# Patient Record
Sex: FEMALE | Race: WHITE | Employment: UNEMPLOYED | URBAN - METROPOLITAN AREA
[De-identification: names, ages, dates, MRNs, and addresses within clinical notes are randomized per-mention and may not be internally consistent; named-entity substitution may affect disease eponyms.]

---

## 2020-08-18 ENCOUNTER — HOSPITAL ENCOUNTER (OUTPATIENT)
Dept: WOMENS IMAGING | Age: 45
Discharge: HOME | End: 2020-08-18
Payer: COMMERCIAL

## 2020-08-18 PROCEDURE — 77063 BREAST TOMOSYNTHESIS BI: CPT

## 2021-06-09 ENCOUNTER — APPOINTMENT (OUTPATIENT)
Dept: GENERAL RADIOLOGY | Age: 46
End: 2021-06-09
Payer: COMMERCIAL

## 2021-06-09 ENCOUNTER — HOSPITAL ENCOUNTER (EMERGENCY)
Age: 46
Discharge: HOME | End: 2021-06-09
Attending: EMERGENCY MEDICINE
Payer: COMMERCIAL

## 2021-06-09 ENCOUNTER — APPOINTMENT (OUTPATIENT)
Dept: MRI IMAGING | Age: 46
End: 2021-06-09
Payer: COMMERCIAL

## 2021-06-09 ENCOUNTER — APPOINTMENT (OUTPATIENT)
Dept: CT IMAGING | Age: 46
End: 2021-06-09
Payer: COMMERCIAL

## 2021-06-09 VITALS
BODY MASS INDEX: 25.72 KG/M2 | HEIGHT: 61 IN | RESPIRATION RATE: 16 BRPM | DIASTOLIC BLOOD PRESSURE: 80 MMHG | HEART RATE: 86 BPM | WEIGHT: 136.24 LBS | TEMPERATURE: 98.2 F | OXYGEN SATURATION: 99 % | SYSTOLIC BLOOD PRESSURE: 113 MMHG

## 2021-06-09 DIAGNOSIS — M51.27 PROTRUSION OF INTERVERTEBRAL DISC OF LUMBOSACRAL REGION: ICD-10-CM

## 2021-06-09 DIAGNOSIS — M54.12 CERVICAL RADICULOPATHY: ICD-10-CM

## 2021-06-09 DIAGNOSIS — R93.89 ABNORMAL FINDING ON CT SCAN: ICD-10-CM

## 2021-06-09 DIAGNOSIS — M54.50 ACUTE BILATERAL LOW BACK PAIN WITHOUT SCIATICA: ICD-10-CM

## 2021-06-09 DIAGNOSIS — M54.6 ACUTE BILATERAL THORACIC BACK PAIN: ICD-10-CM

## 2021-06-09 DIAGNOSIS — M50.30 DDD (DEGENERATIVE DISC DISEASE), CERVICAL: ICD-10-CM

## 2021-06-09 DIAGNOSIS — M54.2 NECK PAIN: Primary | ICD-10-CM

## 2021-06-09 LAB
ANION GAP SERPL CALCULATED.3IONS-SCNC: 10 MMOL/L (ref 3–16)
BACTERIA: ABNORMAL /HPF
BASOPHILS ABSOLUTE: 0 K/UL (ref 0–0.2)
BASOPHILS RELATIVE PERCENT: 0.3 %
BILIRUBIN URINE: NEGATIVE
BLOOD, URINE: NEGATIVE
BUN BLDV-MCNC: 10 MG/DL (ref 7–20)
C-REACTIVE PROTEIN: 22.8 MG/L (ref 0–5.1)
CALCIUM SERPL-MCNC: 8.7 MG/DL (ref 8.3–10.6)
CHLORIDE BLD-SCNC: 104 MMOL/L (ref 99–110)
CLARITY: ABNORMAL
CO2: 25 MMOL/L (ref 21–32)
COLOR: YELLOW
CREAT SERPL-MCNC: 0.6 MG/DL (ref 0.6–1.1)
EOSINOPHILS ABSOLUTE: 0.1 K/UL (ref 0–0.6)
EOSINOPHILS RELATIVE PERCENT: 1 %
EPITHELIAL CELLS, UA: 2 /HPF (ref 0–5)
GFR AFRICAN AMERICAN: >60
GFR NON-AFRICAN AMERICAN: >60
GLUCOSE BLD-MCNC: 101 MG/DL (ref 70–99)
GLUCOSE URINE: NEGATIVE MG/DL
HCT VFR BLD CALC: 40.7 % (ref 36–48)
HEMOGLOBIN: 14 G/DL (ref 12–16)
HYALINE CASTS: 0 /LPF (ref 0–8)
KETONES, URINE: NEGATIVE MG/DL
LACTIC ACID: 0.7 MMOL/L (ref 0.4–2)
LEUKOCYTE ESTERASE, URINE: NEGATIVE
LYMPHOCYTES ABSOLUTE: 2.4 K/UL (ref 1–5.1)
LYMPHOCYTES RELATIVE PERCENT: 21.4 %
MCH RBC QN AUTO: 32.1 PG (ref 26–34)
MCHC RBC AUTO-ENTMCNC: 34.5 G/DL (ref 31–36)
MCV RBC AUTO: 93.1 FL (ref 80–100)
MICROSCOPIC EXAMINATION: YES
MONOCYTES ABSOLUTE: 1.1 K/UL (ref 0–1.3)
MONOCYTES RELATIVE PERCENT: 9.5 %
NEUTROPHILS ABSOLUTE: 7.6 K/UL (ref 1.7–7.7)
NEUTROPHILS RELATIVE PERCENT: 67.8 %
NITRITE, URINE: NEGATIVE
PDW BLD-RTO: 13.9 % (ref 12.4–15.4)
PH UA: 6 (ref 5–8)
PLATELET # BLD: 375 K/UL (ref 135–450)
PMV BLD AUTO: 7.4 FL (ref 5–10.5)
POTASSIUM SERPL-SCNC: 4.1 MMOL/L (ref 3.5–5.1)
PROTEIN UA: NEGATIVE MG/DL
RBC # BLD: 4.37 M/UL (ref 4–5.2)
RBC UA: 1 /HPF (ref 0–4)
S PYO AG THROAT QL: NEGATIVE
SARS-COV-2, NAAT: NOT DETECTED
SEDIMENTATION RATE, ERYTHROCYTE: 52 MM/HR (ref 0–20)
SODIUM BLD-SCNC: 139 MMOL/L (ref 136–145)
SPECIFIC GRAVITY UA: 1 (ref 1–1.03)
TROPONIN: <0.01 NG/ML
URINE REFLEX TO CULTURE: ABNORMAL
URINE TYPE: ABNORMAL
UROBILINOGEN, URINE: 0.2 E.U./DL
WBC # BLD: 11.3 K/UL (ref 4–11)
WBC UA: 4 /HPF (ref 0–5)

## 2021-06-09 PROCEDURE — 6360000004 HC RX CONTRAST MEDICATION: Performed by: NURSE PRACTITIONER

## 2021-06-09 PROCEDURE — 87081 CULTURE SCREEN ONLY: CPT

## 2021-06-09 PROCEDURE — 70450 CT HEAD/BRAIN W/O DYE: CPT

## 2021-06-09 PROCEDURE — 80048 BASIC METABOLIC PNL TOTAL CA: CPT

## 2021-06-09 PROCEDURE — 93005 ELECTROCARDIOGRAM TRACING: CPT | Performed by: EMERGENCY MEDICINE

## 2021-06-09 PROCEDURE — 81001 URINALYSIS AUTO W/SCOPE: CPT

## 2021-06-09 PROCEDURE — 6370000000 HC RX 637 (ALT 250 FOR IP): Performed by: NURSE PRACTITIONER

## 2021-06-09 PROCEDURE — 72156 MRI NECK SPINE W/O & W/DYE: CPT

## 2021-06-09 PROCEDURE — 85025 COMPLETE CBC W/AUTO DIFF WBC: CPT

## 2021-06-09 PROCEDURE — 84484 ASSAY OF TROPONIN QUANT: CPT

## 2021-06-09 PROCEDURE — 96374 THER/PROPH/DIAG INJ IV PUSH: CPT

## 2021-06-09 PROCEDURE — 87040 BLOOD CULTURE FOR BACTERIA: CPT

## 2021-06-09 PROCEDURE — 72125 CT NECK SPINE W/O DYE: CPT

## 2021-06-09 PROCEDURE — 71045 X-RAY EXAM CHEST 1 VIEW: CPT

## 2021-06-09 PROCEDURE — 87880 STREP A ASSAY W/OPTIC: CPT

## 2021-06-09 PROCEDURE — 99283 EMERGENCY DEPT VISIT LOW MDM: CPT

## 2021-06-09 PROCEDURE — 87635 SARS-COV-2 COVID-19 AMP PRB: CPT

## 2021-06-09 PROCEDURE — 83605 ASSAY OF LACTIC ACID: CPT

## 2021-06-09 PROCEDURE — 72158 MRI LUMBAR SPINE W/O & W/DYE: CPT

## 2021-06-09 PROCEDURE — A9577 INJ MULTIHANCE: HCPCS | Performed by: NURSE PRACTITIONER

## 2021-06-09 PROCEDURE — 87077 CULTURE AEROBIC IDENTIFY: CPT

## 2021-06-09 PROCEDURE — 72157 MRI CHEST SPINE W/O & W/DYE: CPT

## 2021-06-09 PROCEDURE — 96375 TX/PRO/DX INJ NEW DRUG ADDON: CPT

## 2021-06-09 PROCEDURE — 6360000002 HC RX W HCPCS: Performed by: NURSE PRACTITIONER

## 2021-06-09 PROCEDURE — 36415 COLL VENOUS BLD VENIPUNCTURE: CPT

## 2021-06-09 PROCEDURE — 86140 C-REACTIVE PROTEIN: CPT

## 2021-06-09 PROCEDURE — 6360000002 HC RX W HCPCS

## 2021-06-09 PROCEDURE — 85652 RBC SED RATE AUTOMATED: CPT

## 2021-06-09 RX ORDER — ACETAMINOPHEN 325 MG/1
650 TABLET ORAL ONCE
Status: COMPLETED | OUTPATIENT
Start: 2021-06-09 | End: 2021-06-09

## 2021-06-09 RX ORDER — DIPHENHYDRAMINE HYDROCHLORIDE 50 MG/ML
25 INJECTION INTRAMUSCULAR; INTRAVENOUS ONCE
Status: COMPLETED | OUTPATIENT
Start: 2021-06-09 | End: 2021-06-09

## 2021-06-09 RX ORDER — METHYLPREDNISOLONE 4 MG/1
TABLET ORAL
Qty: 1 KIT | Refills: 0 | Status: SHIPPED | OUTPATIENT
Start: 2021-06-09

## 2021-06-09 RX ORDER — 0.9 % SODIUM CHLORIDE 0.9 %
30 INTRAVENOUS SOLUTION INTRAVENOUS ONCE
Status: DISCONTINUED | OUTPATIENT
Start: 2021-06-09 | End: 2021-06-10 | Stop reason: HOSPADM

## 2021-06-09 RX ORDER — LORAZEPAM 2 MG/ML
INJECTION INTRAMUSCULAR
Status: COMPLETED
Start: 2021-06-09 | End: 2021-06-09

## 2021-06-09 RX ADMIN — GADOBENATE DIMEGLUMINE 12 ML: 529 INJECTION, SOLUTION INTRAVENOUS at 19:55

## 2021-06-09 RX ADMIN — LORAZEPAM: 2 INJECTION INTRAMUSCULAR; INTRAVENOUS at 19:10

## 2021-06-09 RX ADMIN — ACETAMINOPHEN 650 MG: 325 TABLET ORAL at 15:44

## 2021-06-09 RX ADMIN — DIPHENHYDRAMINE HYDROCHLORIDE 25 MG: 50 INJECTION, SOLUTION INTRAMUSCULAR; INTRAVENOUS at 18:07

## 2021-06-09 RX ADMIN — IBUPROFEN 600 MG: 200 TABLET, FILM COATED ORAL at 15:44

## 2021-06-09 ASSESSMENT — PAIN DESCRIPTION - ONSET: ONSET: ON-GOING

## 2021-06-09 ASSESSMENT — PAIN SCALES - GENERAL
PAINLEVEL_OUTOF10: 10
PAINLEVEL_OUTOF10: 10

## 2021-06-09 ASSESSMENT — PAIN DESCRIPTION - PAIN TYPE: TYPE: ACUTE PAIN

## 2021-06-09 ASSESSMENT — PAIN DESCRIPTION - LOCATION: LOCATION: HEAD;NECK;THROAT

## 2021-06-09 ASSESSMENT — PAIN DESCRIPTION - FREQUENCY: FREQUENCY: CONTINUOUS

## 2021-06-09 NOTE — ED PROVIDER NOTES
used in several months. She is not diabetic. Physical Exam:     Constitutional: No apparent distress. Moderate discomfort. Head: No visible evidence of trauma. Normocephalic. Eyes: Pupils equal and reactive. No photophobia. Conjunctiva normal.    HENT: Oral mucosa moist.  Airway patent. Pharynx reveals mild erythema to the anterior palatine arch and posterior pharynx. Uvula midline. No exudates. No stridor or drooling. Able to swallow saliva without difficulty. Anterior neck was nontender. No lymphadenopathy. Neck:  Soft and supple. Nontender. No point or axial tenderness. No meningeal signs. However, there was significant tenderness along the left trapezius muscle group in the left suprascapular area. The patient was exquisitely tender and complained of severe pain with palpation. No rash. Heart:  Regular rate and rhythm. No murmur. Lungs:  Clear to auscultation. No wheezes, rales, or ronchi. No conversational dyspnea or accessory muscle use. Abdomen:  Soft, non-distended. Nontender. No guarding rigidity or rebound. No CVA tenderness. Musculoskeletal: Thoracic and lumbar spine was nontender to palpation. No point tenderness or step-off.   paravertebral muscle tenderness noted in the thoracolumbar spine. Mild pain with range of motion. Extremities non-tender with full range of motion. Neurological: Alert and oriented x 3. GCS 15.  Speech clear. No acute focal motor or sensory deficits. No dysarthria. No aphasia. No pronator drift. Negative finger-to-nose. Negative heel-to-shin. Great toe extensor strength was 5/5 bilaterally. The patient was able to flex and extend the hip and knee bilaterally against resistance with strength 5/5. Deep tendon reflexes were 2/4 in the patellar and Achilles tendons bilaterally. No saddle anesthesia. No rash. Negative straight leg raising. No radicular pain. Gait was steady. Skin: Skin is warm and dry. No rash.    Psychiatric: Normal mood and affect.  Behavior is normal.     DIAGNOSTIC RESULTS     EKG: All EKG's are interpreted by the Emergency Department Physician who either signs or Co-signs this chart in the absence of a cardiologist.        RADIOLOGY:   Non-plain film images such as CT, Ultrasound and MRI are read by the radiologist. Plain radiographic images are visualized and preliminarily interpreted by the emergency physician with the below findings:        Interpretation per the Radiologist below, if available at the time of this note:    XR CHEST PORTABLE   Final Result   No active cardiopulmonary disease         CT HEAD WO CONTRAST    (Results Pending)   CT CERVICAL SPINE WO CONTRAST    (Results Pending)         ED BEDSIDE ULTRASOUND:   Performed by ED Physician - none    LABS:  Labs Reviewed   CBC WITH AUTO DIFFERENTIAL - Abnormal; Notable for the following components:       Result Value    WBC 11.3 (*)     All other components within normal limits    Narrative:     Performed at:  [address] [place] [address]   Phone [phone]   BASIC METABOLIC PANEL - Abnormal; Notable for the following components:    Glucose 101 (*)     All other components within normal limits    Narrative:     Performed at:  [address] [place] [address]   Phone [phone]   URINE RT REFLEX TO CULTURE - Abnormal; Notable for the following components:    Clarity, UA CLOUDY (*)     All other components within normal limits    Narrative:     Performed at:  [address] [place] [address]   Phone [phone]   MICROSCOPIC URINALYSIS - Abnormal; Notable for the following components:    Bacteria, UA 1+ (*)     All other components within normal limits    Narrative:     Performed at:  [facility]  [address] [place] [address] [place] [address]   Phone [phone]   STREP SCREEN GROUP A THROAT    Narrative:     Performed at:  [facility]  [address] [facility] [place] [address]   Phone [phone]   COVID-19, RAPID   CULTURE, BETA STREP CONFIRM PLATES   CULTURE, BLOOD 1   CULTURE, BLOOD 2   SEDIMENTATION RATE   C-REACTIVE PROTEIN   LACTIC ACID, PLASMA   TROPONIN       All other labs were within normal range or not returned as of this dictation. EMERGENCY DEPARTMENT COURSE and DIFFERENTIAL DIAGNOSIS/MDM:   Vitals:    Vitals:    06/09/21 1307   BP: 93/68   Pulse: 86   Resp: 16   Temp: 98.2 °F (36.8 °C)   TempSrc: Oral   SpO2: 100%   Weight: 136 lb 3.9 oz (61.8 kg)   Height: 5' 1\" (1.549 m)       The patient presents with fatigue, sore throat, aching all over, possible fever, neck pain, mild headache, bilateral earache since yesterday morning. She is afebrile. Blood pressure was 93/68. She was given normal saline 30 mL/kg bolus. White blood cell count is slightly elevated at 11.3. Neutrophil count is normal.  Rapid strep screen is negative. Urinalysis is significant for 4 white cells but negative nitrite. Chest x-ray is unremarkable. Etiology the patient's symptoms is unclear. Covid test is pending. Differential diagnosis would include viral syndrome, COVID-19. Given her history of IV drug use and severe pain in the left side of the neck radiating to the left shoulder with radicular pain described as sharp and burning, I am concerned about the possibility of a cervical radiculopathy. This does raise the possibility of a cervical discitis or even epidural abscess. For this reason, CRP and sed rate have been ordered and are pending. The time of change of shift, test results are pending including CT head and cervical spine, troponin, EKG. If CRP and sed rate are elevated the patient may need further evaluation with MRI.   Care will be transferred to the oncoming physician for follow-up on test results with the nurse practitioner who is caring for the patient. [facility]      CRITICAL CARE TIME   Total Critical Care time was 0 minutes, excluding separately reportable procedures. There was a high probability of clinically significant/life threatening deterioration in the patient's condition which required my urgent intervention. CONSULTS:  None    PROCEDURES:  Unless otherwise noted below, none     Procedures        FINAL IMPRESSION      1. Neck pain    2. Cervical radiculopathy          DISPOSITION/PLAN   DISPOSITION        PATIENT REFERRED TO:  No follow-up provider specified. DISCHARGE MEDICATIONS:  New Prescriptions    No medications on file     Controlled Substances Monitoring:     RX Monitoring 10/26/2016   Attestation The Prescription Monitoring Report for this patient was reviewed today. (Please note that portions of this note were completed with a voice recognition program.  Efforts were made to edit the dictations but occasionally words are mis-transcribed. )    [id number] Juan Manuel Romero DO (electronically signed)  Attending Emergency Physician      Jimmie Muir DO  06/09/21 2588

## 2021-06-09 NOTE — ED PROVIDER NOTES
[facility] Emergency Department    CHIEF COMPLAINT  No chief complaint on file. SHARED SERVICE VISIT:  I have seen and evaluated this patient with my supervising physician, Dr. Cierra Peralta. HISTORY OF PRESENT ILLNESS  Kindra Mir is a 48 y.o.non-toxic appearing female with a history of IVDU who presents to the ED with multiple complaints x2 days, including \"burning and tight\" 8/10 left paraspinous/scapular neck/back pain, \"aching\" 3/10 bilateral otalgia, 5/10 \"aching\" sore throat, subjective fever, chills, sweats, change in taste and decreased urine output. Denies nausea, vomiting, cough, hemoptysis, leg/calf pain or swelling, abdominal pain, urinary symptoms, saddle anesthesia, loss of bowel or bladder control, extremity weakness, urinary retention, or other complaints. She has not received the COVID-19 vaccination. No other complaints, modifying factors or associated symptoms. Nursing notes reviewed. Past Medical History:   Diagnosis Date    ADHD (attention deficit hyperactivity disorder)     Anxiety     Chronic back pain      Past Surgical History:   Procedure Laterality Date    CARPAL TUNNEL RELEASE      HAND SURGERY      LEG SURGERY      SPINE SURGERY       No family history on file.   Social History     Socioeconomic History    Marital status: Single     Spouse name: Not on file    Number of children: Not on file    Years of education: Not on file    Highest education level: Not on file   Occupational History    Not on file   Tobacco Use    Smoking status: Current Every Day Smoker     Packs/day: 0.50     Years: 15.00     Pack years: 7.50   Substance and Sexual Activity    Alcohol use: No    Drug use: No    Sexual activity: Yes   Other Topics Concern    Not on file   Social History Narrative    Not on file     Social Determinants of Health     Financial Resource Strain:     Difficulty of Paying Living Expenses:    Food Insecurity:     Worried About Running Out of Food in the Last Year:    [address] in the Last Year:    Transportation Needs:     Lack of Transportation (Medical):  Lack of Transportation (Non-Medical):    Physical Activity:     Days of Exercise per Week:     Minutes of Exercise per Session:    Stress:     Feeling of Stress :    Social Connections:     Frequency of Communication with Friends and Family:     Frequency of Social Gatherings with Friends and Family:     Attends Buddhism Services:     Active Member of Clubs or Organizations:     Attends Club or Organization Meetings:     Marital Status:    Intimate Partner Violence:     Fear of Current or Ex-Partner:     Emotionally Abused:     Physically Abused:     Sexually Abused:      No current facility-administered medications for this encounter. Current Outpatient Medications   Medication Sig Dispense Refill    ibuprofen (ADVIL;MOTRIN) 600 MG tablet Take 1 tablet by mouth every 6 hours as needed for Pain 30 tablet 0    ALPRAZolam (XANAX) 1 MG tablet Take 1 mg by mouth       Allergies   Allergen Reactions    Adhesive Tape     Codeine Itching    Ketorolac Swelling    Penicillins        REVIEW OF SYSTEMS  6 systems reviewed, pertinent positives per HPI otherwise noted to be negative    PHYSICAL EXAM  BP 93/68   Pulse 86   Temp 98.2 °F (36.8 °C) (Oral)   Resp 16   Ht 5' 1\" (1.549 m)   Wt 136 lb 3.9 oz (61.8 kg)   SpO2 100%   BMI 25.74 kg/m²   GENERAL APPEARANCE: Awake and alert. Cooperative. No acute distress. HEAD: Normocephalic. Atraumatic. EYES: PERRL. EOM's grossly intact. ENT: Mucous membranes are moist.  Uvula is midline. There is erythema noted to posterior pharynx. No exudates. No anterior cervical chain/submandibular adenopathy. NECK: Supple. Normal ROM. There is no midline cervical spine tenderness on palpation. There is no anterior cervical chain adenopathy upon palpation. CHEST: Equal symmetric chest rise. LUNGS: Breathing is unlabored. Speaking comfortably in full sentences. Abdomen: Nondistended. Negative Rovsing, psoas, obturators, Erick's, and Cutler's signs.  + Suprapubic tenderness. Back: + Left CVAT. There is no midline thoracic or lumbar spine tenderness upon palpation. EXTREMITIES: MAEE. No acute deformities. SKIN: Warm and dry. NEUROLOGICAL: Alert and oriented. Strength is 5/5 in all extremities and sensation is intact. RADIOLOGY  No results found. ED COURSE  Patient received ibuprofen and Tylenol for pain, with good relief.      Labs ordered  I have reviewed and interpreted all of the currently available lab results from this visit:  Results for orders placed or performed during the hospital encounter of 06/09/21   Strep Screen Group A Throat    Specimen: Throat   Result Value Ref Range    Rapid Strep A Screen Negative Negative   COVID-19, Rapid    Specimen: Nasopharyngeal Swab   Result Value Ref Range    SARS-CoV-2, NAAT Not Detected Not Detected   CBC Auto Differential   Result Value Ref Range    WBC 11.3 (H) 4.0 - 11.0 K/uL    RBC 4.37 4.00 - 5.20 M/uL    Hemoglobin 14.0 12.0 - 16.0 g/dL    Hematocrit 40.7 36.0 - 48.0 %    MCV 93.1 80.0 - 100.0 fL    MCH 32.1 26.0 - 34.0 pg    MCHC 34.5 31.0 - 36.0 g/dL    RDW 13.9 12.4 - 15.4 %    Platelets 685 322 - 034 K/uL    MPV 7.4 5.0 - 10.5 fL    Neutrophils % 67.8 %    Lymphocytes % 21.4 %    Monocytes % 9.5 %    Eosinophils % 1.0 %    Basophils % 0.3 %    Neutrophils Absolute 7.6 1.7 - 7.7 K/uL    Lymphocytes Absolute 2.4 1.0 - 5.1 K/uL    Monocytes Absolute 1.1 0.0 - 1.3 K/uL    Eosinophils Absolute 0.1 0.0 - 0.6 K/uL    Basophils Absolute 0.0 0.0 - 0.2 K/uL   Basic Metabolic Panel   Result Value Ref Range    Sodium 139 136 - 145 mmol/L    Potassium 4.1 3.5 - 5.1 mmol/L    Chloride 104 99 - 110 mmol/L    CO2 25 21 - 32 mmol/L    Anion Gap 10 3 - 16    Glucose 101 (H) 70 - 99 mg/dL    BUN 10 7 - 20 mg/dL    CREATININE 0.6 0.6 - 1.1 mg/dL    GFR Non-African American >60 >60 GFR African American >60 >60    Calcium 8.7 8.3 - 10.6 mg/dL   Urinalysis Reflex to Culture    Specimen: Urine, clean catch   Result Value Ref Range    Color, UA YELLOW Straw/Yellow    Clarity, UA CLOUDY (A) Clear    Glucose, Ur Negative Negative mg/dL    Bilirubin Urine Negative Negative    Ketones, Urine Negative Negative mg/dL    Specific Gravity, UA 1.005 1.005 - 1.030    Blood, Urine Negative Negative    pH, UA 6.0 5.0 - 8.0    Protein, UA Negative Negative mg/dL    Urobilinogen, Urine 0.2 <2.0 E.U./dL    Nitrite, Urine Negative Negative    Leukocyte Esterase, Urine Negative Negative    Microscopic Examination YES     Urine Type NotGiven     Urine Reflex to Culture Not Indicated    Microscopic Urinalysis   Result Value Ref Range    Bacteria, UA 1+ (A) None Seen /HPF    Hyaline Casts, UA 0 0 - 8 /LPF    WBC, UA 4 0 - 5 /HPF    RBC, UA 1 0 - 4 /HPF    Epithelial Cells, UA 2 0 - 5 /HPF   Sedimentation Rate   Result Value Ref Range    Sed Rate 52 (H) 0 - 20 mm/Hr   C-Reactive Protein   Result Value Ref Range    CRP 22.8 (H) 0.0 - 5.1 mg/L   Lactic Acid, Plasma   Result Value Ref Range    Lactic Acid 0.7 0.4 - 2.0 mmol/L   Troponin   Result Value Ref Range    Troponin <0.01 <0.01 ng/mL           Imaging ordered  CT HEAD WO CONTRAST    Result Date: 6/9/2021  1. No evidence of acute intracranial abnormality. 2. Focal mass centered within the leftward aspect of the quadrigeminal (tectal plate) cistern most consistent with presence of small lipoma with measurements as described above. CT CERVICAL SPINE WO CONTRAST    Result Date: 6/9/2021  Remote fusion of the C6-C7 vertebral bodies with moderate to severe degenerative changes at C5-C6. No acute osseous abnormality. MRI CERVICAL SPINE W WO CONTRAST    Result Date: 6/9/2021  There is fusion at C6-7. Moderate cervical spondylosis at C4-5 and C5-6 resulting in mild spinal canal stenosis. There is moderate left neural foraminal stenosis at C5-6.  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 MRI THORACIC SPINE W WO CONTRAST    Result Date: 6/11/2021  Motion artifact mildly degrades the images. No disc abnormality, stenosis of the thecal sac or narrowing of the neural foramina in the thoracic region. Probable atypical hemangioma of T10. No abnormal enhancement in the spinal canal.     MRI LUMBAR SPINE W WO CONTRAST    Result Date: 6/11/2021  Small disc protrusions at L4-5 and L5-S1. There is mild stenosis of the thecal sac at L4-5. There is narrowing of the neural foramina bilaterally as discussed above. No abnormal enhancement in the lumbar region. XR CHEST PORTABLE    Result Date: 6/9/2021  No active cardiopulmonary disease         A discussion was had with Mrs. Wei Wilson regarding neck pain, cervical radiculopathy, bilateral low back pain, bilateral thoracic, and intention to discharge. Risk management discussed and shared decision making had with patient and/or surrogate. All questions were answered. Patient will follow up with her PCP for further evaluation/treatment. Patient will return to ED for new/worsening symptoms. Patient was sent home with a prescription for Medrol Dosepak. MDM  Patient presents to the emergency department with neck and back pain. Alternative diagnoses are less likely based on history and physical. Considered abdominal aortic aneurysm, cauda equina syndrome, epidural mass lesion (SEA), spinal stenosis, herniated disc causing severe stenosis, sprain/strain, fracture, contusion, sciatica, UTI, pyelonephritis, kidney stone, less but less likely based on history and physical.    As patient has an elevated [email or web address] and ESR 52 mm/h the patient will require MRI of cervical, thoracic, lumbar spine as she does have a history of IV drug use and we are concerned for spinal epidural abscess.     Work-up reveals:  Blood culture #1: Pending     Blood culture #2: Pending    ESR: 52    CRP: 22.8    Troponin: <0.01    Lactic acid: 0.7    COVID-19, rapid: not detected    Culture, beta strep confirm plates: pending    BMP: negative for electrolyte abnormality, renal dysfunctoin, glucose 101, otherwise unremarkable    Urine reflex to culture: clarity cloudy, otherwise unremarkable    Microscopic urinalysis: Clarity cloudy, otherwise unremarkable    CBC: mild leukocytosis with WBC 11.3, otherwise unremarkable    EKG: Sinus bradycardia with a ventricular rate of 53 bpm.    CXR: No active cardiopulmonary disease. CT cervical spine: Remote fusion of the C6-C7 vertebral bodies with moderate to severe degenerative changes at C5-C6. No acute abnormality. CT head: No evidence of acute intracranial abnormality. Focal mass centered within the leftward aspect of the quadrigeminal (tectal plate) cistern most consistent with the presence of small lipoma with measurement as described above. MRI cervical spine: There is fusion of C6-C7. Moderate cervical spondylosis at C4-5 and C5-6 resulting in mild spinal canal stenosis, There is moderate left neural foraminal stenosis at C5-6. 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 MRI lumbar spine: Small disc protrusion at L4-5 and L5-S1. There is mild stenosis of the thecal sac at L4-5. There is narrowing of the neural foramina bilaterally as discussed above. No abnormal enhancement in the lumbar region. MRI thoracic spine: Motion artifact mildly degrades the images. No disc abnormality, stenosis of the thecal sac or narrowing of the neural foramina in the thoracic region. Probable atypical hemangioma of T10. No abnormal enhancement in the spinal canal.    Given the patient's history of IVDU, neck and back pain, and elevated ESR/CRP the patient was evaluated for SEA via MRI of the cervical, thoracic, and lumbar spine. This imaging identified no spinal epidural abscess.       Therefore:  My attending physician and I feel that the patient is safe and appropriate for discharge home with outpatient with her PCP as well as neurosurgery for further evaluation and treatment. She will be prescribed a Medrol Dosepak to be taken as prescribed an return to the emergency department for new or worsening symptoms including but not limited to, developing numbness or tingling between your legs or any backside, loss of bowel or bladder control, inability to ambulate, fever, chills, sweats, nighttime exacerbation of your pain, or other concerns. Patient is agreeable with plan for discharge and follow-up.       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

## 2021-06-09 NOTE — ED NOTES
Received call from MRI that patient was becoming agitated and having difficulty laying still. Dr. Nneka Dukes made aware, gave verbal order for Ativan 2mg IV.      Hector Murphy RN  06/09/21 5641

## 2021-06-09 NOTE — ED PROVIDER NOTES
will be called.    CBC Auto Differential   Result Value Ref Range    WBC 11.3 (H) 4.0 - 11.0 K/uL    RBC 4.37 4.00 - 5.20 M/uL    Hemoglobin 14.0 12.0 - 16.0 g/dL    Hematocrit 40.7 36.0 - 48.0 %    MCV 93.1 80.0 - 100.0 fL    MCH 32.1 26.0 - 34.0 pg    MCHC 34.5 31.0 - 36.0 g/dL    RDW 13.9 12.4 - 15.4 %    Platelets 265 606 - 350 K/uL    MPV 7.4 5.0 - 10.5 fL    Neutrophils % 67.8 %    Lymphocytes % 21.4 %    Monocytes % 9.5 %    Eosinophils % 1.0 %    Basophils % 0.3 %    Neutrophils Absolute 7.6 1.7 - 7.7 K/uL    Lymphocytes Absolute 2.4 1.0 - 5.1 K/uL    Monocytes Absolute 1.1 0.0 - 1.3 K/uL    Eosinophils Absolute 0.1 0.0 - 0.6 K/uL    Basophils Absolute 0.0 0.0 - 0.2 K/uL   Basic Metabolic Panel   Result Value Ref Range    Sodium 139 136 - 145 mmol/L    Potassium 4.1 3.5 - 5.1 mmol/L    Chloride 104 99 - 110 mmol/L    CO2 25 21 - 32 mmol/L    Anion Gap 10 3 - 16    Glucose 101 (H) 70 - 99 mg/dL    BUN 10 7 - 20 mg/dL    CREATININE 0.6 0.6 - 1.1 mg/dL    GFR Non-African American >60 >60    GFR African American >60 >60    Calcium 8.7 8.3 - 10.6 mg/dL   Urinalysis Reflex to Culture    Specimen: Urine, clean catch   Result Value Ref Range    Color, UA YELLOW Straw/Yellow    Clarity, UA CLOUDY (A) Clear    Glucose, Ur Negative Negative mg/dL    Bilirubin Urine Negative Negative    Ketones, Urine Negative Negative mg/dL    Specific Gravity, UA 1.005 1.005 - 1.030    Blood, Urine Negative Negative    pH, UA 6.0 5.0 - 8.0    Protein, UA Negative Negative mg/dL    Urobilinogen, Urine 0.2 <2.0 E.U./dL    Nitrite, Urine Negative Negative    Leukocyte Esterase, Urine Negative Negative    Microscopic Examination YES     Urine Type NotGiven     Urine Reflex to Culture Not Indicated    Microscopic Urinalysis   Result Value Ref Range    Bacteria, UA 1+ (A) None Seen /HPF    Hyaline Casts, UA 0 0 - 8 /LPF    WBC, UA 4 0 - 5 /HPF    RBC, UA 1 0 - 4 /HPF    Epithelial Cells, UA 2 0 - 5 /HPF   Sedimentation Rate   Result Value Ref Range    Sed Rate 52 (H) 0 - 20 mm/Hr   C-Reactive Protein   Result Value Ref Range    CRP 22.8 (H) 0.0 - 5.1 mg/L   Lactic Acid, Plasma   Result Value Ref Range    Lactic Acid 0.7 0.4 - 2.0 mmol/L   Troponin   Result Value Ref Range    Troponin <0.01 <0.01 ng/mL   EKG 12 Lead   Result Value Ref Range    Ventricular Rate 53 BPM    Atrial Rate 53 BPM    P-R Interval 124 ms    QRS Duration 92 ms    Q-T Interval 448 ms    QTc Calculation (Bazett) 420 ms    P Axis 56 degrees    R Axis 69 degrees    T Axis 60 degrees    Diagnosis       Sinus bradycardiaConfirmed by GUERLINE FRAZIER, Denis Alvarenga ([phone]) on 6/10/2021 8:23:41 AM     No results found. ED Medication Orders (From admission, onward)    Start Ordered     Status Ordering Provider    06/09/21 1955 06/09/21 1955  gadobenate dimeglumine (MULTIHANCE) injection 12 mL  IMG ONCE PRN      Last MAR action: Given - by Edison Lei on 06/09/21 at [address] [facility], [address] [facility] E    06/09/21 1903 06/09/21 1903  LORazepam (ATIVAN) 2 MG/ML injection     Note to Pharmacy: Purvi Adler: cabinet override    Last MAR action: Given - by Arianna Bryan on 06/09/21 at 1910     06/09/21 1815 06/09/21 1804  diphenhydrAMINE (BENADRYL) injection 25 mg  ONCE      Last MAR action: Given - by Arianna Bryan on 06/09/21 at [facility] E    06/09/21 1430 06/09/21 1418  ibuprofen (ADVIL;MOTRIN) tablet 600 mg  ONCE      Last MAR action: Given - by KIMBERLYN WOODS A [facility]    06/09/21 1430 06/09/21 1418  acetaminophen (TYLENOL) tablet 650 mg  ONCE      Last MAR action: Given - by WOODS KIMBERLYN A on 06/09/21 at [address] [facility], [address] [facility] E          Final Impression      1. Neck pain    2. Cervical radiculopathy    3. Acute bilateral low back pain without sciatica    4. Acute bilateral thoracic back pain    5. Abnormal finding on CT scan    6. DDD (degenerative disc disease), cervical    7.  Protrusion of intervertebral disc of lumbosacral region        DISPOSITION (Please note that portions of this note may have been completed with a voice recognition program. Efforts were made to edit the dictations but occasionally words are mis-transcribed.)    MD Jatinder Romero MD  06/13/21 0203

## 2021-06-10 LAB
EKG ATRIAL RATE: 53 BPM
EKG DIAGNOSIS: NORMAL
EKG P AXIS: 56 DEGREES
EKG P-R INTERVAL: 124 MS
EKG Q-T INTERVAL: 448 MS
EKG QRS DURATION: 92 MS
EKG QTC CALCULATION (BAZETT): 420 MS
EKG R AXIS: 69 DEGREES
EKG T AXIS: 60 DEGREES
EKG VENTRICULAR RATE: 53 BPM

## 2021-06-10 PROCEDURE — 93010 ELECTROCARDIOGRAM REPORT: CPT | Performed by: INTERNAL MEDICINE

## 2021-06-10 NOTE — ED NOTES
Patient discharged with discharge instructions and medications reviewed. Patient verbalized understanding of instructions and follow up. Discharged home with mother.       Santhosh Betts RN  06/09/21 1520

## 2021-06-11 LAB
ORGANISM: ABNORMAL
S PYO THROAT QL CULT: ABNORMAL
S PYO THROAT QL CULT: ABNORMAL

## 2021-06-13 LAB
BLOOD CULTURE, ROUTINE: NORMAL
CULTURE, BLOOD 2: NORMAL

## 2024-12-26 ENCOUNTER — APPOINTMENT (OUTPATIENT)
Dept: GENERAL RADIOLOGY | Age: 49
End: 2024-12-26
Payer: OTHER MISCELLANEOUS

## 2024-12-26 ENCOUNTER — HOSPITAL ENCOUNTER (EMERGENCY)
Age: 49
Discharge: HOME | End: 2024-12-26
Payer: OTHER MISCELLANEOUS

## 2024-12-26 VITALS
RESPIRATION RATE: 14 BRPM | DIASTOLIC BLOOD PRESSURE: 81 MMHG | SYSTOLIC BLOOD PRESSURE: 118 MMHG | OXYGEN SATURATION: 98 % | BODY MASS INDEX: 29 KG/M2 | WEIGHT: 147.71 LBS | TEMPERATURE: 98.7 F | HEIGHT: 60 IN | HEART RATE: 73 BPM
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
## 2024-12-26 DIAGNOSIS — M62.838 TRAPEZIUS MUSCLE SPASM: ICD-10-CM

## 2024-12-26 DIAGNOSIS — S16.1XXA ACUTE STRAIN OF NECK MUSCLE, INITIAL ENCOUNTER: ICD-10-CM

## 2024-12-26 PROCEDURE — 72040 X-RAY EXAM NECK SPINE 2-3 VW: CPT

## 2024-12-26 PROCEDURE — 73030 X-RAY EXAM OF SHOULDER: CPT

## 2024-12-26 PROCEDURE — 99283 EMERGENCY DEPT VISIT LOW MDM: CPT

## 2024-12-26 RX ORDER — METHOCARBAMOL 750 MG/1
750 TABLET, FILM COATED ORAL 3 TIMES DAILY PRN
Qty: 30 TABLET | Refills: 0 | Status: SHIPPED | OUTPATIENT
Start: 2024-12-26 | End: 2025-01-05

## 2024-12-26 RX ORDER — LIDOCAINE 50 MG/G
1 PATCH TOPICAL DAILY
Qty: 10 PATCH | Refills: 0 | Status: SHIPPED | OUTPATIENT
Start: 2024-12-26 | End: 2024-12-26

## 2024-12-26 RX ORDER — LIDOCAINE 50 MG/G
1 PATCH TOPICAL DAILY
Qty: 10 PATCH | Refills: 0 | Status: SHIPPED | OUTPATIENT
Start: 2024-12-26 | End: 2025-01-05

## 2024-12-26 RX ORDER — METHOCARBAMOL 750 MG/1
750 TABLET, FILM COATED ORAL 3 TIMES DAILY PRN
Qty: 30 TABLET | Refills: 0 | Status: SHIPPED | OUTPATIENT
Start: 2024-12-26 | End: 2024-12-26

## 2024-12-26 RX ORDER — METHOCARBAMOL 500 MG/1
750 TABLET, FILM COATED ORAL ONCE
Status: DISCONTINUED | OUTPATIENT
Start: 2024-12-26 | End: 2024-12-26 | Stop reason: HOSPADM

## 2024-12-26 RX ORDER — ACETAMINOPHEN 500 MG
1000 TABLET ORAL ONCE
Status: DISCONTINUED | OUTPATIENT
Start: 2024-12-26 | End: 2024-12-26 | Stop reason: HOSPADM

## 2024-12-26 RX ORDER — LIDOCAINE 4 G/G
1 PATCH TOPICAL ONCE
Status: DISCONTINUED | OUTPATIENT
Start: 2024-12-26 | End: 2024-12-26 | Stop reason: HOSPADM

## 2024-12-26 ASSESSMENT — PAIN - FUNCTIONAL ASSESSMENT: PAIN_FUNCTIONAL_ASSESSMENT: 0-10

## 2024-12-26 ASSESSMENT — PAIN SCALES - GENERAL: PAINLEVEL_OUTOF10: 8

## 2024-12-27 ASSESSMENT — ENCOUNTER SYMPTOMS
GASTROINTESTINAL NEGATIVE: 1
RESPIRATORY NEGATIVE: 1

## 2024-12-27 NOTE — ED NOTES
Discharge and education instructions reviewed. Patient verbalized understanding, teach-back successful. Patient denied questions at this time. No acute distress noted. Patient instructed to follow-up as noted - return to emergency department if symptoms worsen. Patient verbalized understanding. Discharged per EDMD with discharge instructions.    
1
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
